# Patient Record
Sex: MALE | Race: WHITE | URBAN - METROPOLITAN AREA
[De-identification: names, ages, dates, MRNs, and addresses within clinical notes are randomized per-mention and may not be internally consistent; named-entity substitution may affect disease eponyms.]

---

## 2019-06-01 ENCOUNTER — VIRTUAL VISIT (OUTPATIENT)
Dept: FAMILY MEDICINE | Facility: OTHER | Age: 29
End: 2019-06-01

## 2019-06-02 NOTE — PROGRESS NOTES
"Date:   Clinician: Shalini Lakhani  Clinician NPI: [id number]  Patient: Harjeet Mitchell  Patient : 1990  Patient Address: [address] [place] [id number]  Patient Phone: [phone]  Visit Protocol: URI  Patient Summary:  Harjeet is a 29 year old ( : 1990 ) male who initiated a Visit for cold, sinus infection, or influenza. When asked the question \"Please sign me up to receive news, health information and promotions. \", Harjeet responded \"No\".    Harjeet states his symptoms started 1-2 days ago.   His symptoms consist of a headache, a sore throat, malaise, myalgia, and enlarged lymph nodes. Harjeet also feels feverish but was unable to measure his temperature.   Symptom details     Sore throat: Harjeet reports having moderate throat pain (4-6 on a 10 point pain scale), has exudate on his tonsils, and can swallow liquids. The lymph nodes in his neck are enlarged. A rash has not appeared on the skin since the sore throat started.     Headache: He states the headache is mild (1-3 on a 10 point pain scale).      Harjeet denies having rhinitis, wheezing, teeth pain, nasal congestion, ear pain, facial pain or pressure, cough, and chills. He also denies having recent facial or sinus surgery in the past 60 days, having a sinus infection within the past year, and taking antibiotic medication for the symptoms. He is not experiencing dyspnea.   Harjeet is not sure if he has been exposed to someone with strep throat. He has not recently been exposed to someone with influenza. Harjeet has been in close contact with the following high risk individuals: adults 65 or older and children under the age of 5.   Weight: 235 lbs   Harjeet does not smoke or use smokeless tobacco.   MEDICATIONS: No current medications, ALLERGIES: NKDA  Clinician Response:  [facility] Strep Test    I am sorry you are not feeling well. Your strep test has . Based on the information provided, it is possible that " you could have strep throat. When left untreated, strep can spread to other areas of the body and cause a more serious infection.  A strep test is the only way to determine if a bacterial infection or a virus is causing your sore throat. This is done in a lab where a swab of the back of your throat is collected tested for the bacteria that causes strep.  Since you chose not to use the lab order, please be seen:     In a clinic or urgent care    Within 24 hours     Call 911 or go to the emergency room if you suddenly develop a rash, are drooling or unable to swallow fluids, if you are having difficulty breathing, or feel that your throat is closing off.   Diagnosis: [facility] Strep  Diagnosis ICD: J02.0  [facility] Results: [place] Strep Test -   [facility] Secondary Results: null